# Patient Record
Sex: FEMALE | Race: WHITE | NOT HISPANIC OR LATINO | Employment: FULL TIME | URBAN - NONMETROPOLITAN AREA
[De-identification: names, ages, dates, MRNs, and addresses within clinical notes are randomized per-mention and may not be internally consistent; named-entity substitution may affect disease eponyms.]

---

## 2024-10-14 ENCOUNTER — OFFICE VISIT (OUTPATIENT)
Dept: PODIATRY | Facility: CLINIC | Age: 64
End: 2024-10-14
Payer: COMMERCIAL

## 2024-10-14 VITALS
HEIGHT: 68 IN | BODY MASS INDEX: 43.04 KG/M2 | HEART RATE: 62 BPM | WEIGHT: 284 LBS | DIASTOLIC BLOOD PRESSURE: 84 MMHG | SYSTOLIC BLOOD PRESSURE: 164 MMHG | TEMPERATURE: 98 F | OXYGEN SATURATION: 98 %

## 2024-10-14 DIAGNOSIS — L84 CALLUS: ICD-10-CM

## 2024-10-14 DIAGNOSIS — I87.2 VENOUS INSUFFICIENCY: Primary | ICD-10-CM

## 2024-10-14 DIAGNOSIS — M79.672 LEFT FOOT PAIN: ICD-10-CM

## 2024-10-14 DIAGNOSIS — M24.573 EQUINUS CONTRACTURE OF ANKLE: ICD-10-CM

## 2024-10-14 PROCEDURE — 99203 OFFICE O/P NEW LOW 30 MIN: CPT | Performed by: PODIATRIST

## 2024-10-14 RX ORDER — ROSUVASTATIN CALCIUM 5 MG/1
5 TABLET, COATED ORAL EVERY EVENING
COMMUNITY

## 2024-10-14 RX ORDER — NAPROXEN SODIUM 220 MG/1
220 TABLET, FILM COATED ORAL
COMMUNITY

## 2024-10-14 NOTE — PROGRESS NOTES
"Ambulatory Visit  Name: Alva Ingram      : 1960      MRN: [id number]  Encounter Provider: Arjun Anderson DPM  Encounter Date: 10/14/2024   Encounter department: [facility] [place]    Assessment & Plan  Venous insufficiency         Left foot pain         Callus         Equinus contracture of ankle           -I would recommend that she go to pedicure location with her lack of trophic findings  - We did pare calluses down to tolerance with no charge  - I would recommend routine maintenance of these areas with urea cream and a Pedi roller  - Advised eccentric calf stretching.         History of Present Illness     Alva Ingram is a 63 y.o. female who presents evaluation management of bilateral feet.  She is here for painful calluses that she cannot bend down and cut herself.  They cause her pain as she ambulates further in her shoes.      Review of Systems   Constitutional:  Negative for chills and fever.   HENT:  Negative for ear pain and sore throat.    Eyes:  Negative for pain and visual disturbance.   Respiratory:  Negative for cough and shortness of breath.    Cardiovascular:  Negative for chest pain and palpitations.   Gastrointestinal:  Negative for abdominal pain and vomiting.   Genitourinary:  Negative for dysuria and hematuria.   Musculoskeletal:  Negative for arthralgias and back pain.   Skin:  Negative for color change and rash.   Neurological:  Negative for seizures and syncope.   All other systems reviewed and are negative.          Objective     /84 (BP Location: Right arm, Patient Position: Sitting, Cuff Size: Large)   Pulse 62   Temp 98 °F (36.7 °C) (Temporal)   Ht 5' 8\" (1.727 m)   Wt 129 kg (284 lb)   SpO2 98%   BMI 43.18 kg/m²     Physical Exam  Vitals reviewed.   Constitutional:       Appearance: Normal appearance. She is obese.   HENT:      Head: Normocephalic and atraumatic.      Nose: Nose normal.      Mouth/Throat:      Mouth: Mucous " membranes are moist.   Eyes:      Pupils: Pupils are equal, round, and reactive to light.   Pulmonary:      Effort: Pulmonary effort is normal.   Musculoskeletal:      Comments: Her DP PT pulses are plus 2 out of 4.  No pitting edema.  No paresthesias.  There are small telangiectasias in the medial gaiter area bilaterally and a slight hemosiderin deposit on the anterior aspect of her left lower extremity, small hammertoes bilaterally, minimal nail thickening, minimal nail changes, but there are diffuse callosities and forefoot callus changes with callus subleft fifth metatarsal head with central nidus that he does have direct pain with palpation.   Skin:     Capillary Refill: Capillary refill takes 2 to 3 seconds.   Neurological:      General: No focal deficit present.      Mental Status: She is alert and oriented to person, place, and time. Mental status is at baseline.